# Patient Record
Sex: FEMALE | Race: WHITE | NOT HISPANIC OR LATINO
[De-identification: names, ages, dates, MRNs, and addresses within clinical notes are randomized per-mention and may not be internally consistent; named-entity substitution may affect disease eponyms.]

---

## 2017-08-05 ENCOUNTER — APPOINTMENT (OUTPATIENT)
Dept: FAMILY MEDICINE | Facility: CLINIC | Age: 22
End: 2017-08-05
Payer: COMMERCIAL

## 2017-08-05 VITALS
OXYGEN SATURATION: 99 % | TEMPERATURE: 98.9 F | DIASTOLIC BLOOD PRESSURE: 60 MMHG | SYSTOLIC BLOOD PRESSURE: 112 MMHG | BODY MASS INDEX: 22.66 KG/M2 | WEIGHT: 120 LBS | HEIGHT: 61 IN | HEART RATE: 112 BPM | RESPIRATION RATE: 10 BRPM

## 2017-08-05 DIAGNOSIS — F07.81 POSTCONCUSSIONAL SYNDROME: ICD-10-CM

## 2017-08-05 PROCEDURE — 99213 OFFICE O/P EST LOW 20 MIN: CPT

## 2017-10-21 ENCOUNTER — TRANSCRIPTION ENCOUNTER (OUTPATIENT)
Age: 22
End: 2017-10-21

## 2017-11-29 ENCOUNTER — TRANSCRIPTION ENCOUNTER (OUTPATIENT)
Age: 22
End: 2017-11-29

## 2018-02-06 ENCOUNTER — TRANSCRIPTION ENCOUNTER (OUTPATIENT)
Age: 23
End: 2018-02-06

## 2018-06-19 ENCOUNTER — EMERGENCY (EMERGENCY)
Facility: HOSPITAL | Age: 23
LOS: 1 days | Discharge: HOME | End: 2018-06-19
Attending: EMERGENCY MEDICINE
Payer: COMMERCIAL

## 2018-06-19 VITALS
TEMPERATURE: 98 F | OXYGEN SATURATION: 99 % | HEART RATE: 100 BPM | SYSTOLIC BLOOD PRESSURE: 107 MMHG | DIASTOLIC BLOOD PRESSURE: 75 MMHG | RESPIRATION RATE: 20 BRPM

## 2018-06-19 VITALS — WEIGHT: 130.07 LBS | HEIGHT: 61 IN

## 2018-06-19 DIAGNOSIS — Z90.89 ACQUIRED ABSENCE OF OTHER ORGANS: Chronic | ICD-10-CM

## 2018-06-19 LAB
ALBUMIN SERPL ELPH-MCNC: 4.1 G/DL — SIGNIFICANT CHANGE UP (ref 3.3–5.2)
ALP SERPL-CCNC: 51 U/L — SIGNIFICANT CHANGE UP (ref 40–120)
ALT FLD-CCNC: 17 U/L — SIGNIFICANT CHANGE UP
ANION GAP SERPL CALC-SCNC: 13 MMOL/L — SIGNIFICANT CHANGE UP (ref 5–17)
APPEARANCE UR: CLEAR — SIGNIFICANT CHANGE UP
AST SERPL-CCNC: 35 U/L — HIGH
BACTERIA # UR AUTO: ABNORMAL
BASOPHILS # BLD AUTO: 0 K/UL — SIGNIFICANT CHANGE UP (ref 0–0.2)
BASOPHILS NFR BLD AUTO: 0.3 % — SIGNIFICANT CHANGE UP (ref 0–2)
BILIRUB SERPL-MCNC: 0.4 MG/DL — SIGNIFICANT CHANGE UP (ref 0.4–2)
BILIRUB UR-MCNC: NEGATIVE — SIGNIFICANT CHANGE UP
BUN SERPL-MCNC: 13 MG/DL — SIGNIFICANT CHANGE UP (ref 8–20)
CALCIUM SERPL-MCNC: 8.9 MG/DL — SIGNIFICANT CHANGE UP (ref 8.6–10.2)
CHLORIDE SERPL-SCNC: 104 MMOL/L — SIGNIFICANT CHANGE UP (ref 98–107)
CO2 SERPL-SCNC: 22 MMOL/L — SIGNIFICANT CHANGE UP (ref 22–29)
COLOR SPEC: YELLOW — SIGNIFICANT CHANGE UP
CREAT SERPL-MCNC: 0.72 MG/DL — SIGNIFICANT CHANGE UP (ref 0.5–1.3)
D DIMER BLD IA.RAPID-MCNC: <150 NG/ML DDU — SIGNIFICANT CHANGE UP
DIFF PNL FLD: ABNORMAL
EOSINOPHIL # BLD AUTO: 0.2 K/UL — SIGNIFICANT CHANGE UP (ref 0–0.5)
EOSINOPHIL NFR BLD AUTO: 2.5 % — SIGNIFICANT CHANGE UP (ref 0–6)
EPI CELLS # UR: ABNORMAL
GLUCOSE SERPL-MCNC: 91 MG/DL — SIGNIFICANT CHANGE UP (ref 70–115)
GLUCOSE UR QL: NEGATIVE MG/DL — SIGNIFICANT CHANGE UP
HCG UR QL: NEGATIVE — SIGNIFICANT CHANGE UP
HCT VFR BLD CALC: 39.8 % — SIGNIFICANT CHANGE UP (ref 37–47)
HGB BLD-MCNC: 13.5 G/DL — SIGNIFICANT CHANGE UP (ref 12–16)
KETONES UR-MCNC: NEGATIVE — SIGNIFICANT CHANGE UP
LEUKOCYTE ESTERASE UR-ACNC: ABNORMAL
LYMPHOCYTES # BLD AUTO: 2.1 K/UL — SIGNIFICANT CHANGE UP (ref 1–4.8)
LYMPHOCYTES # BLD AUTO: 23.1 % — SIGNIFICANT CHANGE UP (ref 20–55)
MCHC RBC-ENTMCNC: 30.3 PG — SIGNIFICANT CHANGE UP (ref 27–31)
MCHC RBC-ENTMCNC: 33.9 G/DL — SIGNIFICANT CHANGE UP (ref 32–36)
MCV RBC AUTO: 89.4 FL — SIGNIFICANT CHANGE UP (ref 81–99)
MONOCYTES # BLD AUTO: 1.2 K/UL — HIGH (ref 0–0.8)
MONOCYTES NFR BLD AUTO: 12.6 % — HIGH (ref 3–10)
NEUTROPHILS # BLD AUTO: 5.7 K/UL — SIGNIFICANT CHANGE UP (ref 1.8–8)
NEUTROPHILS NFR BLD AUTO: 61.3 % — SIGNIFICANT CHANGE UP (ref 37–73)
NITRITE UR-MCNC: NEGATIVE — SIGNIFICANT CHANGE UP
PH UR: 6 — SIGNIFICANT CHANGE UP (ref 5–8)
PLATELET # BLD AUTO: 339 K/UL — SIGNIFICANT CHANGE UP (ref 150–400)
POTASSIUM SERPL-MCNC: 5.3 MMOL/L — SIGNIFICANT CHANGE UP (ref 3.5–5.3)
POTASSIUM SERPL-SCNC: 5.3 MMOL/L — SIGNIFICANT CHANGE UP (ref 3.5–5.3)
PROT SERPL-MCNC: 7 G/DL — SIGNIFICANT CHANGE UP (ref 6.6–8.7)
PROT UR-MCNC: 30 MG/DL
RBC # BLD: 4.45 M/UL — SIGNIFICANT CHANGE UP (ref 4.4–5.2)
RBC # FLD: 12.8 % — SIGNIFICANT CHANGE UP (ref 11–15.6)
RBC CASTS # UR COMP ASSIST: ABNORMAL /HPF (ref 0–4)
SODIUM SERPL-SCNC: 139 MMOL/L — SIGNIFICANT CHANGE UP (ref 135–145)
SP GR SPEC: 1.01 — SIGNIFICANT CHANGE UP (ref 1.01–1.02)
UROBILINOGEN FLD QL: NEGATIVE MG/DL — SIGNIFICANT CHANGE UP
WBC # BLD: 9.3 K/UL — SIGNIFICANT CHANGE UP (ref 4.8–10.8)
WBC # FLD AUTO: 9.3 K/UL — SIGNIFICANT CHANGE UP (ref 4.8–10.8)
WBC UR QL: >50

## 2018-06-19 PROCEDURE — 99284 EMERGENCY DEPT VISIT MOD MDM: CPT

## 2018-06-19 PROCEDURE — 85379 FIBRIN DEGRADATION QUANT: CPT

## 2018-06-19 PROCEDURE — 96374 THER/PROPH/DIAG INJ IV PUSH: CPT | Mod: XU

## 2018-06-19 PROCEDURE — 81025 URINE PREGNANCY TEST: CPT

## 2018-06-19 PROCEDURE — 74177 CT ABD & PELVIS W/CONTRAST: CPT | Mod: 26

## 2018-06-19 PROCEDURE — 81001 URINALYSIS AUTO W/SCOPE: CPT

## 2018-06-19 PROCEDURE — 80053 COMPREHEN METABOLIC PANEL: CPT

## 2018-06-19 PROCEDURE — 36415 COLL VENOUS BLD VENIPUNCTURE: CPT

## 2018-06-19 PROCEDURE — 74177 CT ABD & PELVIS W/CONTRAST: CPT

## 2018-06-19 PROCEDURE — 85027 COMPLETE CBC AUTOMATED: CPT

## 2018-06-19 PROCEDURE — 87086 URINE CULTURE/COLONY COUNT: CPT

## 2018-06-19 RX ORDER — ACETAMINOPHEN 500 MG
975 TABLET ORAL ONCE
Qty: 0 | Refills: 0 | Status: COMPLETED | OUTPATIENT
Start: 2018-06-19 | End: 2018-06-19

## 2018-06-19 RX ORDER — CEPHALEXIN 500 MG
1 CAPSULE ORAL
Qty: 28 | Refills: 0 | OUTPATIENT
Start: 2018-06-19 | End: 2018-06-25

## 2018-06-19 RX ORDER — NORETHINDRONE AND ETHINYL ESTRADIOL 0.4-0.035
1 KIT ORAL
Qty: 0 | Refills: 0 | COMMUNITY

## 2018-06-19 RX ORDER — SODIUM CHLORIDE 9 MG/ML
1000 INJECTION INTRAMUSCULAR; INTRAVENOUS; SUBCUTANEOUS ONCE
Qty: 0 | Refills: 0 | Status: COMPLETED | OUTPATIENT
Start: 2018-06-19 | End: 2018-06-19

## 2018-06-19 RX ORDER — CEFTRIAXONE 500 MG/1
1 INJECTION, POWDER, FOR SOLUTION INTRAMUSCULAR; INTRAVENOUS ONCE
Qty: 0 | Refills: 0 | Status: COMPLETED | OUTPATIENT
Start: 2018-06-19 | End: 2018-06-19

## 2018-06-19 RX ADMIN — Medication 975 MILLIGRAM(S): at 08:35

## 2018-06-19 RX ADMIN — SODIUM CHLORIDE 1000 MILLILITER(S): 9 INJECTION INTRAMUSCULAR; INTRAVENOUS; SUBCUTANEOUS at 08:34

## 2018-06-19 RX ADMIN — CEFTRIAXONE 100 GRAM(S): 500 INJECTION, POWDER, FOR SOLUTION INTRAMUSCULAR; INTRAVENOUS at 09:48

## 2018-06-19 NOTE — ED ADULT TRIAGE NOTE - CHIEF COMPLAINT QUOTE
"I woke up with lower back pain, it started Sunday but is worsening and I can't even take a deep breath without pain. I thought I had a UTI because I started having symptoms but they went away and now I have this back pain. " Pt denies fever/chills, states she feel SOB at kvng"

## 2018-06-19 NOTE — ED PROVIDER NOTE - OBJECTIVE STATEMENT
This is a 22 year old female with c/o R sided flank pain x 1 day.  She notes pain radiates to her back.  She reports was having urinary urgency.  She thought she was having a UTI, and notes urinary symptoms resolved, however back pain persisted.   Patient denies any possibility of pregancy.  She reports is on Lo Estro for birth control.  She reports episodes of being out of breath.  She notes no fevers, chills, n/v/d or any recent travel or rashes.

## 2018-06-19 NOTE — ED PROVIDER NOTE - ATTENDING CONTRIBUTION TO CARE
22 year old female seen and evaluated with ACP  PResents for right flank pain x 1 day  notes associated urinary symptoms  vitals reviewed, exam as documented  hcg for pregnancy/ectopic  urine for uti, CT for renal stone/gallstone  treat symptomatically, check results, reassess  abxs for early pyelo  f/u

## 2018-06-21 LAB
CULTURE RESULTS: SIGNIFICANT CHANGE UP
SPECIMEN SOURCE: SIGNIFICANT CHANGE UP

## 2018-07-13 ENCOUNTER — TRANSCRIPTION ENCOUNTER (OUTPATIENT)
Age: 23
End: 2018-07-13

## 2018-08-14 ENCOUNTER — TRANSCRIPTION ENCOUNTER (OUTPATIENT)
Age: 23
End: 2018-08-14

## 2019-01-07 ENCOUNTER — TRANSCRIPTION ENCOUNTER (OUTPATIENT)
Age: 24
End: 2019-01-07

## 2019-01-15 ENCOUNTER — TRANSCRIPTION ENCOUNTER (OUTPATIENT)
Age: 24
End: 2019-01-15

## 2019-05-02 ENCOUNTER — APPOINTMENT (OUTPATIENT)
Dept: FAMILY MEDICINE | Facility: CLINIC | Age: 24
End: 2019-05-02
Payer: COMMERCIAL

## 2019-05-02 VITALS
DIASTOLIC BLOOD PRESSURE: 70 MMHG | BODY MASS INDEX: 22.66 KG/M2 | OXYGEN SATURATION: 98 % | HEIGHT: 61 IN | SYSTOLIC BLOOD PRESSURE: 118 MMHG | RESPIRATION RATE: 12 BRPM | HEART RATE: 90 BPM | WEIGHT: 120 LBS

## 2019-05-02 PROCEDURE — 99213 OFFICE O/P EST LOW 20 MIN: CPT

## 2019-05-02 NOTE — HISTORY OF PRESENT ILLNESS
[FreeTextEntry8] : 23-year-old female not seen in this office since 2017, here to get a referral to dermatology for followup acne.

## 2019-05-02 NOTE — HEALTH RISK ASSESSMENT
[] : No [No falls in past year] : Patient reported no falls in the past year [0] : 2) Feeling down, depressed, or hopeless: Not at all (0) [[id number]] : 0

## 2019-05-02 NOTE — ASSESSMENT
[FreeTextEntry1] : Acne--- dermatology referral given for followup.\par \par Recommend patient schedule CPE and fasting blood work-

## 2019-05-02 NOTE — PHYSICAL EXAM
[No Acute Distress] : no acute distress [Well Nourished] : well nourished [No Respiratory Distress] : no respiratory distress  [Normal Rate] : normal rate  [No Accessory Muscle Use] : no accessory muscle use [Clear to Auscultation] : lungs were clear to auscultation bilaterally [No Edema] : there was no peripheral edema [No Murmur] : no murmur heard [Normal S1, S2] : normal S1 and S2 [Regular Rhythm] : with a regular rhythm [Non Tender] : non-tender [Soft] : abdomen soft [No HSM] : no HSM [No Rash] : no rash [Normal Gait] : normal gait [Acne] : acne [Normal Affect] : the affect was normal [Coordination Grossly Intact] : coordination grossly intact [No Focal Deficits] : no focal deficits [Normal Insight/Judgement] : insight and judgment were intact

## 2019-09-19 ENCOUNTER — APPOINTMENT (OUTPATIENT)
Dept: GASTROENTEROLOGY | Facility: CLINIC | Age: 24
End: 2019-09-19
Payer: COMMERCIAL

## 2019-09-19 VITALS
HEART RATE: 88 BPM | WEIGHT: 149 LBS | BODY MASS INDEX: 28.13 KG/M2 | HEIGHT: 61 IN | SYSTOLIC BLOOD PRESSURE: 104 MMHG | DIASTOLIC BLOOD PRESSURE: 78 MMHG

## 2019-09-19 DIAGNOSIS — Z87.42 PERSONAL HISTORY OF OTHER DISEASES OF THE FEMALE GENITAL TRACT: ICD-10-CM

## 2019-09-19 PROCEDURE — 99203 OFFICE O/P NEW LOW 30 MIN: CPT

## 2019-09-19 RX ORDER — AMOXICILLIN AND CLAVULANATE POTASSIUM 875; 125 MG/1; MG/1
875-125 TABLET, COATED ORAL
Qty: 14 | Refills: 0 | Status: COMPLETED | COMMUNITY
Start: 2017-08-05 | End: 2019-09-19

## 2019-09-19 RX ORDER — LIFITEGRAST 50 MG/ML
5 SOLUTION/ DROPS OPHTHALMIC
Qty: 60 | Refills: 0 | Status: COMPLETED | COMMUNITY
Start: 2017-06-29 | End: 2019-09-19

## 2019-09-20 NOTE — HISTORY OF PRESENT ILLNESS
[de-identified] : This is a 23 year old woman with a history of painful hemorrhoids. She states that for the last 1-2 years she has painful external hemorrhoids. She had a severe episode 2 weeks ago. She has tried multiple OT C meds without relief. She denies constipation or diarrhea.  Today she has no hemorrhoid pain but she is frustrated.

## 2019-09-20 NOTE — ASSESSMENT
[FreeTextEntry1] : This is a 23 year old woman with intermittent painful ext hemorrhoids. She may have had a partially thrombosed hemorrhoid a fe weeks ago I will refer her to colorectal surgery because she is interested in a hemorrhoidectomy.

## 2019-09-20 NOTE — PHYSICAL EXAM
[General Appearance - Alert] : alert [General Appearance - In No Acute Distress] : in no acute distress [Sclera] : the sclera and conjunctiva were normal [Extraocular Movements] : extraocular movements were intact [PERRL With Normal Accommodation] : pupils were equal in size, round, and reactive to light [Oropharynx] : the oropharynx was normal [Outer Ear] : the ears and nose were normal in appearance [Neck Appearance] : the appearance of the neck was normal [Neck Cervical Mass (___cm)] : no neck mass was observed [Thyroid Nodule] : there were no palpable thyroid nodules [Thyroid Diffuse Enlargement] : the thyroid was not enlarged [Jugular Venous Distention Increased] : there was no jugular-venous distention [Auscultation Breath Sounds / Voice Sounds] : lungs were clear to auscultation bilaterally [Heart Sounds Gallop] : no gallops [Heart Rate And Rhythm] : heart rate was normal and rhythm regular [Heart Sounds] : normal S1 and S2 [Murmurs] : no murmurs [Heart Sounds Pericardial Friction Rub] : no pericardial rub [Bowel Sounds] : normal bowel sounds [Abdomen Soft] : soft [Abdomen Tenderness] : non-tender [No CVA Tenderness] : no ~M costovertebral angle tenderness [Abdomen Mass (___ Cm)] : no abdominal mass palpated [No Spinal Tenderness] : no spinal tenderness [Skin Turgor] : normal skin turgor [] : no rash [Skin Color & Pigmentation] : normal skin color and pigmentation [Affect] : the affect was normal [Impaired Insight] : insight and judgment were intact [Oriented To Time, Place, And Person] : oriented to person, place, and time

## 2019-10-01 ENCOUNTER — APPOINTMENT (OUTPATIENT)
Dept: COLORECTAL SURGERY | Facility: CLINIC | Age: 24
End: 2019-10-01
Payer: COMMERCIAL

## 2019-10-01 VITALS
BODY MASS INDEX: 27.48 KG/M2 | DIASTOLIC BLOOD PRESSURE: 67 MMHG | HEART RATE: 80 BPM | WEIGHT: 140 LBS | SYSTOLIC BLOOD PRESSURE: 106 MMHG | RESPIRATION RATE: 14 BRPM | TEMPERATURE: 98.6 F | HEIGHT: 60 IN

## 2019-10-01 PROCEDURE — 99203 OFFICE O/P NEW LOW 30 MIN: CPT

## 2019-10-01 NOTE — HISTORY OF PRESENT ILLNESS
[FreeTextEntry1] : Liza presents to the office for consultation. She reports a h/o hemorrhoidal skin tags that she would like removed secondary to irritation and pain. She denies issues with constipation or straining to pass stools or to lift heavy items. She denies hemorrhoidal burning, itching or knife like pains. There is very occasional bleeding when wiping after BMs.

## 2019-10-01 NOTE — PHYSICAL EXAM
[Normal rectal exam] : exam was normal [Reduce Spontaneously] : a spontaneously reducible (grade II) [Skin Tags] : residual hemorrhoidal skin tags were noted [Normal] : was normal [None] : there was no rectal mass  [No Rash or Lesion] : No rash or lesion [Alert] : alert [Oriented to Person] : oriented to person [Oriented to Place] : oriented to place [Oriented to Time] : oriented to time [Calm] : calm [de-identified] : Anterior and posterior dimunitive skin tags [de-identified] : NAD [de-identified] : NCAT [de-identified] : BEATTY x 4

## 2019-10-01 NOTE — ASSESSMENT
[FreeTextEntry1] : Ms. Marquez presents to the office for discussion regarding bedside excision of a residual hemorrhoidal skin tags. She was advised of the elective nature of this office procedure, the duration of the procedure, methods of excision using lidocaine for anesthesia and cautery for hemostasis, post procedure pain and limitations on activities, methods for wound care, as well as the time until recovery. She understands and is agreeable, and will schedule this to be completed in the near future.\par \par

## 2019-10-14 ENCOUNTER — APPOINTMENT (OUTPATIENT)
Dept: COLORECTAL SURGERY | Facility: CLINIC | Age: 24
End: 2019-10-14
Payer: COMMERCIAL

## 2019-10-14 VITALS
SYSTOLIC BLOOD PRESSURE: 117 MMHG | HEIGHT: 60 IN | BODY MASS INDEX: 27.48 KG/M2 | RESPIRATION RATE: 14 BRPM | TEMPERATURE: 98.5 F | HEART RATE: 79 BPM | WEIGHT: 140 LBS | DIASTOLIC BLOOD PRESSURE: 71 MMHG

## 2019-10-14 PROCEDURE — 46230 REMOVAL OF ANAL TAGS: CPT

## 2019-10-14 PROCEDURE — 99214 OFFICE O/P EST MOD 30 MIN: CPT | Mod: 25

## 2019-10-14 NOTE — ASSESSMENT
[FreeTextEntry1] : Liza presents to the office for excision of her anal skin tags. This was performed at the office bedside. The operative site was prepped with Betadine, marked and anesthetized with 1% lidocaine to good effect. Sharp scissors was used to perform the excision and Monsell's was then utilized for hemostasis.\par Patient was advised to expect post procedure pain for at least one week's time. During this period, she is to avoid significant amount of straining or activity that could result in excess tissue swelling and lead to a recurrence of the external hemorrhoid/skin tag. She was also advised to expect post procedure drainage of the wound as it heals in by secondary intention. Cotton gauze should be placed within the undergarments to prevent soilage, and sitz bath should be performed 2-3 times daily to keep the site wound site clean and facilitate healing.\par

## 2019-10-14 NOTE — PHYSICAL EXAM
[Reduce Spontaneously] : a spontaneously reducible (grade II) [Normal rectal exam] : exam was normal [Skin Tags] : residual hemorrhoidal skin tags were noted [Normal] : was normal [None] : there was no rectal mass  [No Rash or Lesion] : No rash or lesion [Alert] : alert [Oriented to Person] : oriented to person [Oriented to Place] : oriented to place [Calm] : calm [Oriented to Time] : oriented to time [de-identified] : NAD [de-identified] : Anterior and posterior dimunitive skin tags [de-identified] : BEATTY x 4 [de-identified] : NCAT

## 2019-10-21 ENCOUNTER — APPOINTMENT (OUTPATIENT)
Dept: COLORECTAL SURGERY | Facility: CLINIC | Age: 24
End: 2019-10-21
Payer: COMMERCIAL

## 2019-10-21 ENCOUNTER — TRANSCRIPTION ENCOUNTER (OUTPATIENT)
Age: 24
End: 2019-10-21

## 2019-10-21 VITALS
TEMPERATURE: 98.6 F | WEIGHT: 140 LBS | BODY MASS INDEX: 27.48 KG/M2 | HEART RATE: 103 BPM | HEIGHT: 60 IN | RESPIRATION RATE: 14 BRPM | SYSTOLIC BLOOD PRESSURE: 121 MMHG | DIASTOLIC BLOOD PRESSURE: 66 MMHG

## 2019-10-21 DIAGNOSIS — K64.9 UNSPECIFIED HEMORRHOIDS: ICD-10-CM

## 2019-10-21 PROCEDURE — 99024 POSTOP FOLLOW-UP VISIT: CPT

## 2019-10-21 NOTE — HISTORY OF PRESENT ILLNESS
[FreeTextEntry1] : 23 year old female who presents for follow up appointment after undergoing office based excision of external hemorrhoids. She is doing well and reports improved pain but noticed a lump in the excision area which concerned her. No fevers or chills. She is having regular bowel movements without difficulty.

## 2019-10-21 NOTE — PHYSICAL EXAM
[Respiratory Effort] : normal respiratory effort [Calm] : calm [de-identified] : healing perianal excision wounds without sign of infection [de-identified] : well appearing, in no distress [de-identified] : normocephalic, atraumatic [de-identified] : alert and oriented x 3 [de-identified] : warm and dry

## 2020-01-07 ENCOUNTER — APPOINTMENT (OUTPATIENT)
Dept: FAMILY MEDICINE | Facility: CLINIC | Age: 25
End: 2020-01-07
Payer: COMMERCIAL

## 2020-01-07 ENCOUNTER — TRANSCRIPTION ENCOUNTER (OUTPATIENT)
Age: 25
End: 2020-01-07

## 2020-01-07 VITALS
DIASTOLIC BLOOD PRESSURE: 70 MMHG | BODY MASS INDEX: 27.48 KG/M2 | SYSTOLIC BLOOD PRESSURE: 118 MMHG | RESPIRATION RATE: 12 BRPM | HEIGHT: 60 IN | WEIGHT: 140 LBS | HEART RATE: 85 BPM | OXYGEN SATURATION: 98 %

## 2020-01-07 DIAGNOSIS — Z83.3 FAMILY HISTORY OF DIABETES MELLITUS: ICD-10-CM

## 2020-01-07 DIAGNOSIS — Z80.9 FAMILY HISTORY OF MALIGNANT NEOPLASM, UNSPECIFIED: ICD-10-CM

## 2020-01-07 DIAGNOSIS — R05 COUGH: ICD-10-CM

## 2020-01-07 DIAGNOSIS — Z78.9 OTHER SPECIFIED HEALTH STATUS: ICD-10-CM

## 2020-01-07 DIAGNOSIS — Z87.09 PERSONAL HISTORY OF OTHER DISEASES OF THE RESPIRATORY SYSTEM: ICD-10-CM

## 2020-01-07 DIAGNOSIS — L70.9 ACNE, UNSPECIFIED: ICD-10-CM

## 2020-01-07 DIAGNOSIS — Z20.828 CONTACT WITH AND (SUSPECTED) EXPOSURE TO OTHER VIRAL COMMUNICABLE DISEASES: ICD-10-CM

## 2020-01-07 DIAGNOSIS — J06.9 ACUTE UPPER RESPIRATORY INFECTION, UNSPECIFIED: ICD-10-CM

## 2020-01-07 DIAGNOSIS — R68.89 OTHER GENERAL SYMPTOMS AND SIGNS: ICD-10-CM

## 2020-01-07 PROCEDURE — 99214 OFFICE O/P EST MOD 30 MIN: CPT

## 2020-01-07 RX ORDER — AMOXICILLIN 875 MG/1
875 TABLET, FILM COATED ORAL
Qty: 20 | Refills: 0 | Status: COMPLETED | COMMUNITY
Start: 2019-10-21

## 2020-01-07 RX ORDER — TRETINOIN 0.25 MG/G
0.03 CREAM TOPICAL
Qty: 20 | Refills: 0 | Status: COMPLETED | COMMUNITY
Start: 2019-10-04

## 2020-01-13 ENCOUNTER — TRANSCRIPTION ENCOUNTER (OUTPATIENT)
Age: 25
End: 2020-01-13

## 2020-01-14 ENCOUNTER — APPOINTMENT (OUTPATIENT)
Dept: COLORECTAL SURGERY | Facility: CLINIC | Age: 25
End: 2020-01-14
Payer: MEDICAID

## 2020-01-14 VITALS
DIASTOLIC BLOOD PRESSURE: 71 MMHG | HEART RATE: 99 BPM | WEIGHT: 140 LBS | SYSTOLIC BLOOD PRESSURE: 102 MMHG | BODY MASS INDEX: 27.48 KG/M2 | RESPIRATION RATE: 14 BRPM | HEIGHT: 60 IN

## 2020-01-14 DIAGNOSIS — K64.4 RESIDUAL HEMORRHOIDAL SKIN TAGS: ICD-10-CM

## 2020-01-14 PROCEDURE — 99214 OFFICE O/P EST MOD 30 MIN: CPT | Mod: 25

## 2020-01-14 PROCEDURE — 46220 EXCISE ANAL EXT TAG/PAPILLA: CPT

## 2020-01-14 NOTE — HISTORY OF PRESENT ILLNESS
[FreeTextEntry1] : Liza returns to office for followup of her external hemorrhoids. She reports healing well from her bedside excision of residual hemorrhoidal skin tags. Unfortunately, in the month of December, she had a very busy schedule at work and perfomed significant heavy lifting with resultant swollen external hemorrhoids that have only recently begun to subside. She continues to deny any significant straining to evacuate stools, and notes her stools are soft and passed on a regular basis without bleeding or other issues.

## 2020-01-14 NOTE — PHYSICAL EXAM
[Tender, Swollen] : tender, swollen [Thrombosed] : that was thrombosed [de-identified] : Left posterior

## 2020-01-14 NOTE — ASSESSMENT
[FreeTextEntry1] : Ms. Marquez presents to the office for excision of external hemorrhoidal skin tag with associated thrombosis. This was performed at the office bedside. The operative site was prepped with Betadine, marked and anesthetized with 1% lidocaine to good effect. Sharp scissors was used to perform the excision and Monsell's was then utilized for hemostasis.\par Patient was advised to expect post procedure pain for at least one week's time. During this period, she is to avoid significant amount of straining or activity that could result in excess tissue swelling and lead to a recurrence of the external hemorrhoid/skin tag. She was also advised to expect post procedure drainage of the wound as it heals in by secondary intention. Cotton gauze should be placed within the undergarments to prevent soilage, and sitz bath should be performed 2-3 times daily to keep the site wound site clean and facilitate healing.\par

## 2020-01-22 ENCOUNTER — APPOINTMENT (OUTPATIENT)
Dept: COLORECTAL SURGERY | Facility: CLINIC | Age: 25
End: 2020-01-22
Payer: MEDICAID

## 2020-01-22 VITALS
WEIGHT: 140 LBS | TEMPERATURE: 98.5 F | SYSTOLIC BLOOD PRESSURE: 114 MMHG | RESPIRATION RATE: 14 BRPM | HEART RATE: 99 BPM | DIASTOLIC BLOOD PRESSURE: 76 MMHG | HEIGHT: 60 IN | BODY MASS INDEX: 27.48 KG/M2

## 2020-01-22 DIAGNOSIS — K62.89 OTHER SPECIFIED DISEASES OF ANUS AND RECTUM: ICD-10-CM

## 2020-01-22 DIAGNOSIS — K60.2 ANAL FISSURE, UNSPECIFIED: ICD-10-CM

## 2020-01-22 PROCEDURE — 99214 OFFICE O/P EST MOD 30 MIN: CPT

## 2020-01-22 NOTE — ASSESSMENT
[FreeTextEntry1] : Ms. Marquez presents to the office for excision of external hemorrhoidal skin tag with associated thrombosis. This was performed at the office bedside. The operative site was prepped with Betadine, marked and anesthetized with 1% lidocaine to good effect. Sharp scissors was used to perform the excision and Monsell's was then utilized for hemostasis.\par Patient was advised to expect post procedure pain for at least one week's time. During this period, she is to avoid significant amount of straining or activity that could result in excess tissue swelling and lead to a recurrence of the external hemorrhoid/skin tag. She was also advised to expect post procedure drainage of the wound as it heals in by secondary intention. Cotton gauze should be placed within the undergarments to prevent soilage, and sitz bath should be performed 2-3 times daily to keep the site wound site clean and facilitate healing.\par \par 1/22/20 Returns to office for followup given anorectal pain. PE reveals that the wound base is clean but because of site of excision close to anal verge, may be causing anal fissure like pain with defecation. Advised pt of the findings and recommendations for bowel regimen, warm sitz baths, ibuprofen and analpram cream.\par Otherwise reassured that she appears to be healing in as expected.

## 2020-01-22 NOTE — HISTORY OF PRESENT ILLNESS
[FreeTextEntry1] : Liza returns to office for followup of her external hemorrhoids. She reports healing well from her bedside excision of residual hemorrhoidal skin tags. Unfortunately, in the month of December, she had a very busy schedule at work and perfomed significant heavy lifting with resultant swollen external hemorrhoids that have only recently begun to subside. She continues to deny any significant straining to evacuate stools, and notes her stools are soft and passed on a regular basis without bleeding or other issues.\par \par 1/22/20 Since her last office visit 1 week earlier, she reports not healing as well from this bedside excision as from prior. She continues passing soft stools, but reports sharp stabbing pains afterwards and associated throbbing. 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

## 2020-01-22 NOTE — PHYSICAL EXAM
[Tender, Swollen] : nontender, non-swollen [Thrombosed] : that was not thrombosed [de-identified] : Left posterior wound clean and granulating without infection or e/o swelling

## 2020-08-11 ENCOUNTER — TRANSCRIPTION ENCOUNTER (OUTPATIENT)
Age: 25
End: 2020-08-11

## 2020-11-07 ENCOUNTER — TRANSCRIPTION ENCOUNTER (OUTPATIENT)
Age: 25
End: 2020-11-07

## 2021-12-08 ENCOUNTER — TRANSCRIPTION ENCOUNTER (OUTPATIENT)
Age: 26
End: 2021-12-08

## 2022-03-24 ENCOUNTER — TRANSCRIPTION ENCOUNTER (OUTPATIENT)
Age: 27
End: 2022-03-24

## 2022-04-11 ENCOUNTER — APPOINTMENT (OUTPATIENT)
Dept: NEUROLOGY | Facility: CLINIC | Age: 27
End: 2022-04-11
Payer: COMMERCIAL

## 2022-04-11 ENCOUNTER — NON-APPOINTMENT (OUTPATIENT)
Age: 27
End: 2022-04-11

## 2022-04-11 VITALS
BODY MASS INDEX: 27.48 KG/M2 | WEIGHT: 140 LBS | DIASTOLIC BLOOD PRESSURE: 80 MMHG | HEIGHT: 60 IN | SYSTOLIC BLOOD PRESSURE: 114 MMHG

## 2022-04-11 DIAGNOSIS — R51.9 HEADACHE, UNSPECIFIED: ICD-10-CM

## 2022-04-11 PROCEDURE — 99204 OFFICE O/P NEW MOD 45 MIN: CPT

## 2022-04-11 RX ORDER — HYDROCORTISONE 25 MG/G
2.5 CREAM TOPICAL
Qty: 1 | Refills: 2 | Status: COMPLETED | COMMUNITY
Start: 2020-01-22 | End: 2022-04-11

## 2022-04-11 RX ORDER — OSELTAMIVIR PHOSPHATE 75 MG/1
75 CAPSULE ORAL TWICE DAILY
Qty: 10 | Refills: 0 | Status: COMPLETED | COMMUNITY
Start: 2020-01-07 | End: 2022-04-11

## 2022-04-11 RX ORDER — ONDANSETRON 4 MG/1
4 TABLET, ORALLY DISINTEGRATING ORAL
Qty: 15 | Refills: 0 | Status: COMPLETED | COMMUNITY
Start: 2020-01-12 | End: 2022-04-11

## 2022-04-11 RX ORDER — DAPSONE 75 MG/G
7.5 GEL TOPICAL
Qty: 60 | Refills: 0 | Status: COMPLETED | COMMUNITY
Start: 2019-05-08 | End: 2022-04-11

## 2022-04-11 RX ORDER — TRETINOIN 1 MG/G
0.1 CREAM TOPICAL
Qty: 20 | Refills: 0 | Status: COMPLETED | COMMUNITY
Start: 2019-05-08 | End: 2022-04-11

## 2022-04-11 RX ORDER — HYDROCORTISONE 25 MG/G
2.5 CREAM TOPICAL
Qty: 28 | Refills: 0 | Status: COMPLETED | COMMUNITY
Start: 2019-07-29 | End: 2022-04-11

## 2022-04-11 NOTE — ASSESSMENT
[FreeTextEntry1] : Intractable migraine headaches\par Her examination is normal with flat discs\par I am suggesting prophylaxis with propranolol 40 mg twice a day\par She can continue to use the sumatriptan but she says she will use 50 mg instead of 100 mg due to the above-mentioned side effects\par Follow-up in 6 weeks and by phone prior to that if needed.

## 2022-04-11 NOTE — HISTORY OF PRESENT ILLNESS
[FreeTextEntry1] : She reports migraine type headaches going on for at least the last 10 years.  She has seen a neurologist in the past with normal brain MRI.  They have become more frequent.  Now getting them almost every other day.  She gets severe pain behind the eyes.  Is throbbing.  Occasionally blurred vision.  Frequent nausea with occasional vomiting.  Typically there is photophobia.  Sumatriptan  100 mg generally works but she feels high on it and does not like taking it.\par \par Medical history otherwise unremarkable\par \par She is a .

## 2022-04-11 NOTE — CONSULT LETTER
[Dear  ___] : Dear  [unfilled], [Consult Letter:] : I had the pleasure of evaluating your patient, [unfilled]. [Please see my note below.] : Please see my note below. [Consult Closing:] : Thank you very much for allowing me to participate in the care of this patient.  If you have any questions, please do not hesitate to contact me. [Sincerely,] : Sincerely, [FreeTextEntry3] : Jerald Colunga MD, PhD, DPN-N\par [facility]\par Neurology at [place]\par Chief, Division of Neurology\par [facility]\par

## 2022-04-11 NOTE — PHYSICAL EXAM
[FreeTextEntry1] : There is no cervical palpation tenderness.\par There is full range of movement of the cervical spine\par  [General Appearance - Alert] : alert [General Appearance - In No Acute Distress] : in no acute distress [General Appearance - Well-Appearing] : healthy appearing [Oriented To Time, Place, And Person] : oriented to person, place, and time [Impaired Insight] : insight and judgment were intact [Memory Recent] : recent memory was not impaired [Affect] : the affect was normal [Person] : oriented to person [Place] : oriented to place [Time] : oriented to time [Concentration Intact] : normal concentrating ability [Fluency] : fluency intact [Comprehension] : comprehension intact [Cranial Nerves Optic (II)] : visual acuity intact bilaterally,  visual fields full to confrontation, pupils equal round and reactive to light [Cranial Nerves Oculomotor (III)] : extraocular motion intact [Cranial Nerves Trigeminal (V)] : facial sensation intact symmetrically [Cranial Nerves Facial (VII)] : face symmetrical [Cranial Nerves Vestibulocochlear (VIII)] : hearing was intact bilaterally [Cranial Nerves Glossopharyngeal (IX)] : tongue and palate midline [Cranial Nerves Accessory (XI - Cranial And Spinal)] : head turning and shoulder shrug symmetric [Cranial Nerves Hypoglossal (XII)] : there was no tongue deviation with protrusion [Motor Tone] : muscle tone was normal in all four extremities [Motor Strength] : muscle strength was normal in all four extremities [No Muscle Atrophy] : normal bulk in all four extremities [Paresis Pronator Drift Right-Sided] : no pronator drift on the right [Paresis Pronator Drift Left-Sided] : no pronator drift on the left [Sensation Tactile Decrease] : light touch was intact [Sensation Pain / Temperature Decrease] : pain and temperature was intact [Romberg's Sign] : Romberg's sign was negtive [Abnormal Walk] : normal gait [Balance] : balance was intact [Past-pointing] : there was no past-pointing [Tremor] : no tremor present [Coordination - Dysmetria Impaired Finger-to-Nose Bilateral] : not present [Coordination - Dysmetria Impaired Heel-to-Shin Bilateral] : not present [2+] : Ankle jerk left 2+ [Plantar Reflex Right Only] : normal on the right [Plantar Reflex Left Only] : normal on the left [PERRL With Normal Accommodation] : pupils were equal in size, round, reactive to light, with normal accommodation [Extraocular Movements] : extraocular movements were intact [Optic Disc Abnormality] : the optic disc were normal in size and color [Full Visual Field] : full visual field

## 2022-05-23 ENCOUNTER — APPOINTMENT (OUTPATIENT)
Dept: NEUROLOGY | Facility: CLINIC | Age: 27
End: 2022-05-23
Payer: COMMERCIAL

## 2022-05-23 VITALS
SYSTOLIC BLOOD PRESSURE: 110 MMHG | WEIGHT: 135 LBS | HEIGHT: 60 IN | BODY MASS INDEX: 26.5 KG/M2 | DIASTOLIC BLOOD PRESSURE: 60 MMHG

## 2022-05-23 PROCEDURE — 99214 OFFICE O/P EST MOD 30 MIN: CPT

## 2022-05-23 NOTE — CONSULT LETTER
[Dear  ___] : Dear  [unfilled], [Consult Letter:] : I had the pleasure of evaluating your patient, [unfilled]. [Please see my note below.] : Please see my note below. [Consult Closing:] : Thank you very much for allowing me to participate in the care of this patient.  If you have any questions, please do not hesitate to contact me. [Sincerely,] : Sincerely, [FreeTextEntry3] : Jerald Colunga MD, PhD, DPN-N\par [facility]\par Neurology at [place]\par Chief, Division of Neurology\par [facility]\par

## 2022-05-23 NOTE — PHYSICAL EXAM
[FreeTextEntry1] : \par  [General Appearance - Alert] : alert [General Appearance - In No Acute Distress] : in no acute distress [General Appearance - Well-Appearing] : healthy appearing [Oriented To Time, Place, And Person] : oriented to person, place, and time [Impaired Insight] : insight and judgment were intact [Affect] : the affect was normal [Memory Recent] : recent memory was not impaired [Person] : oriented to person [Place] : oriented to place [Time] : oriented to time [Concentration Intact] : normal concentrating ability [Fluency] : fluency intact [Comprehension] : comprehension intact [Cranial Nerves Optic (II)] : visual acuity intact bilaterally,  visual fields full to confrontation, pupils equal round and reactive to light [Cranial Nerves Oculomotor (III)] : extraocular motion intact [Cranial Nerves Trigeminal (V)] : facial sensation intact symmetrically [Cranial Nerves Facial (VII)] : face symmetrical [Cranial Nerves Vestibulocochlear (VIII)] : hearing was intact bilaterally [Cranial Nerves Glossopharyngeal (IX)] : tongue and palate midline [Cranial Nerves Accessory (XI - Cranial And Spinal)] : head turning and shoulder shrug symmetric [Cranial Nerves Hypoglossal (XII)] : there was no tongue deviation with protrusion [Motor Tone] : muscle tone was normal in all four extremities [Motor Strength] : muscle strength was normal in all four extremities [No Muscle Atrophy] : normal bulk in all four extremities [Paresis Pronator Drift Right-Sided] : no pronator drift on the right [Paresis Pronator Drift Left-Sided] : no pronator drift on the left [Sensation Tactile Decrease] : light touch was intact [Sensation Pain / Temperature Decrease] : pain and temperature was intact [Romberg's Sign] : Romberg's sign was negtive [Abnormal Walk] : normal gait [Balance] : balance was intact [Past-pointing] : there was no past-pointing [Tremor] : no tremor present [Coordination - Dysmetria Impaired Finger-to-Nose Bilateral] : not present [Coordination - Dysmetria Impaired Heel-to-Shin Bilateral] : not present [2+] : Ankle jerk left 2+ [Plantar Reflex Right Only] : normal on the right [Plantar Reflex Left Only] : normal on the left [PERRL With Normal Accommodation] : pupils were equal in size, round, reactive to light, with normal accommodation [Extraocular Movements] : extraocular movements were intact [Optic Disc Abnormality] : the optic disc were normal in size and color [Full Visual Field] : full visual field

## 2022-05-23 NOTE — HISTORY OF PRESENT ILLNESS
[FreeTextEntry1] : I saw her initially 4/11/2022 with the following history.  She reports migraine type headaches going on for at least the last 10 years.  She has seen a neurologist in the past with normal brain MRI.  They have become more frequent.  Now getting them almost every other day.  She gets severe pain behind the eyes.  Is throbbing.  Occasionally blurred vision.  Frequent nausea with occasional vomiting.  Typically there is photophobia.  Sumatriptan  100 mg generally works but she feels high on it and does not like taking it.\par \par Medical history otherwise unremarkable\par \par She is a .\par \par I started her on propranolol 40 mg twice a day for headache prophylaxis.  She has had an excellent response with no significant headaches since starting the propranolol.\par \par However, she informs me today, 5/23/2022 that she may be in early pregnancy.

## 2022-05-23 NOTE — END OF VISIT
[Time Spent: ___ minutes] : I have spent [unfilled] minutes of time on the encounter. complains of pain/discomfort

## 2022-05-23 NOTE — ASSESSMENT
[FreeTextEntry1] : Intractable migraine headaches\par Her examination is normal \par She has had an excellent response to propranolol 40 mg twice a day for headache prophylaxis\par However, she informs me today that she may be pregnant\par I have told her that she cannot take the sumatriptan  and to hold off on the propranolol until she clears it with her gynecologist\par \par Follow-up here in 3 months and by phone prior to that as needed.

## 2022-07-10 ENCOUNTER — RX RENEWAL (OUTPATIENT)
Age: 27
End: 2022-07-10

## 2022-08-23 ENCOUNTER — APPOINTMENT (OUTPATIENT)
Dept: NEUROLOGY | Facility: CLINIC | Age: 27
End: 2022-08-23

## 2022-08-23 VITALS
SYSTOLIC BLOOD PRESSURE: 116 MMHG | DIASTOLIC BLOOD PRESSURE: 80 MMHG | BODY MASS INDEX: 27.68 KG/M2 | HEIGHT: 60 IN | WEIGHT: 141 LBS

## 2022-08-23 DIAGNOSIS — G43.019 MIGRAINE W/OUT AURA, INTRACTABLE, W/OUT STATUS MIGRAINOSUS: ICD-10-CM

## 2022-08-23 PROCEDURE — 99214 OFFICE O/P EST MOD 30 MIN: CPT

## 2022-08-23 NOTE — HISTORY OF PRESENT ILLNESS
[FreeTextEntry1] : I saw her initially 4/11/2022 with the following history.  She reports migraine type headaches going on for at least the last 10 years.  She has seen a neurologist in the past with normal brain MRI.  They have become more frequent.  Now getting them almost every other day.  She gets severe pain behind the eyes.  Is throbbing.  Occasionally blurred vision.  Frequent nausea with occasional vomiting.  Typically there is photophobia.  Sumatriptan  100 mg generally works but she feels high on it and does not like taking it.\par \par Medical history otherwise unremarkable\par \par She is a .\par \par I started her on propranolol 40 mg twice a day for headache prophylaxis.  She has had an excellent response with no significant headaches since starting the propranolol.\par \par She is 18 weeks pregnant.  She continues on the propranolol after  clearing it with her gynecologist.  It is working well.

## 2022-08-23 NOTE — ASSESSMENT
[FreeTextEntry1] : Intractable migraine headaches\par Her examination is normal \par She has had an excellent response to propranolol 40 mg twice a day for headache prophylaxis\par Now 18 weeks  pregnant\par Aware that she should not be using the sumatriptan and she does not really need it as propranolol is working well\par \par Follow-up here in 6 months and by phone prior to that as needed.

## 2022-08-23 NOTE — CONSULT LETTER
[Dear  ___] : Dear  [unfilled], [Consult Letter:] : I had the pleasure of evaluating your patient, [unfilled]. [Please see my note below.] : Please see my note below. [Consult Closing:] : Thank you very much for allowing me to participate in the care of this patient.  If you have any questions, please do not hesitate to contact me. [Sincerely,] : Sincerely, [FreeTextEntry3] : Jerald Colunga MD, PhD, DPN-N\par [facility]\par Neurology at [place]\par Chief, Division of Neurology\par [facility]\par

## 2022-11-10 ENCOUNTER — NON-APPOINTMENT (OUTPATIENT)
Age: 27
End: 2022-11-10

## 2022-12-27 ENCOUNTER — NON-APPOINTMENT (OUTPATIENT)
Age: 27
End: 2022-12-27

## 2023-02-23 ENCOUNTER — APPOINTMENT (OUTPATIENT)
Dept: NEUROLOGY | Facility: CLINIC | Age: 28
End: 2023-02-23

## 2023-03-23 ENCOUNTER — APPOINTMENT (OUTPATIENT)
Dept: COLORECTAL SURGERY | Facility: CLINIC | Age: 28
End: 2023-03-23
Payer: COMMERCIAL

## 2023-03-23 VITALS
HEART RATE: 94 BPM | RESPIRATION RATE: 14 BRPM | DIASTOLIC BLOOD PRESSURE: 70 MMHG | WEIGHT: 173 LBS | HEIGHT: 61 IN | TEMPERATURE: 98.7 F | OXYGEN SATURATION: 99 % | BODY MASS INDEX: 32.66 KG/M2 | SYSTOLIC BLOOD PRESSURE: 117 MMHG

## 2023-03-23 DIAGNOSIS — K64.4 RESIDUAL HEMORRHOIDAL SKIN TAGS: ICD-10-CM

## 2023-03-23 PROCEDURE — 46600 DIAGNOSTIC ANOSCOPY SPX: CPT

## 2023-03-23 NOTE — ASSESSMENT
[FreeTextEntry1] : Ms. Marquez presents to the office for excision of external hemorrhoidal skin tag with associated thrombosis. This was performed at the office bedside. The operative site was prepped with Betadine, marked and anesthetized with 1% lidocaine to good effect. Sharp scissors was used to perform the excision and Monsell's was then utilized for hemostasis.\par Patient was advised to expect post procedure pain for at least one week's time. During this period, she is to avoid significant amount of straining or activity that could result in excess tissue swelling and lead to a recurrence of the external hemorrhoid/skin tag. She was also advised to expect post procedure drainage of the wound as it heals in by secondary intention. Cotton gauze should be placed within the undergarments to prevent soilage, and sitz bath should be performed 2-3 times daily to keep the site wound site clean and facilitate healing.\par \par 1/22/20 Returns to office for followup given anorectal pain. PE reveals that the wound base is clean but because of site of excision close to anal verge, may be causing anal fissure like pain with defecation. Advised pt of the findings and recommendations for bowel regimen, warm sitz baths, ibuprofen and analpram cream.\par Otherwise reassured that she appears to be healing in as expected.\par \par 3/23/23  Liza presents to the office with circumferential external hemorrhoids that she would like to have removed secondary to difficulty with hygiene as well as swelling and discomfort. I have advised her on the need to maintain a regular bowel regimen in the preop and postoperative period to prevent recurrent disease.  I have discussed with the risks/benefits/alternatives for external hemorrhoidectomy. Although the surgery is performed as a same-day procedure, and does not take longer than an hour to complete, the postoperative pain is the most difficult obstacle for patients to overcome. She is to expect rectal pain, pressure and discomfort for anywhere from 2-4 weeks duration during which time activity will likely be kept to a minimum. She will be provided with narcotic medication in addition to anti-inflammatories to alleviate the discomfort, and she should perform sitz baths frequently for comfort. Postoperatively, she will have open perianal wounds that will heal by secondary intention, so she is to expect serosanguineous drainage that will gradually improve.  She is aware of the likelihood for recurrent disease if she becomes pregnant again in the future.  After all questions and concerns were addressed, she consented to proceed.\par \par \par \par

## 2023-03-23 NOTE — HISTORY OF PRESENT ILLNESS
[FreeTextEntry1] : Liza returns to office for followup of her external hemorrhoids. She reports healing well from her bedside excision of residual hemorrhoidal skin tags. Unfortunately, in the month of December, she had a very busy schedule at work and perfomed significant heavy lifting with resultant swollen external hemorrhoids that have only recently begun to subside. She continues to deny any significant straining to evacuate stools, and notes her stools are soft and passed on a regular basis without bleeding or other issues.\par \par 1/22/20 Since her last office visit 1 week earlier, she reports not healing as well from this bedside excision as from prior. She continues passing soft stools, but reports sharp stabbing pains afterwards and associated throbbing. 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  Since her last appointment, she has delivered her first child, a baby girl–Armando, in January 2023.  During her pregnancy and delivery, she developed very large external hemorrhoids which have become less edematous and are now circumferential residual skin tags.  She has a significant amount of difficulties maintaining anorectal hygiene, and as such, it is impairing her quality of life.  Here to discuss excision.  No reports of constipation, straining or rectal bleeding.

## 2023-03-23 NOTE — PHYSICAL EXAM
[Normal rectal exam] : exam was normal [Reduce Spontaneously] : a spontaneously reducible (grade II) [Tender, Swollen] : nontender, non-swollen [Thrombosed] : that was not thrombosed [Skin Tags] : residual hemorrhoidal skin tags were noted [Normal] : was normal [None] : there was no rectal mass  [Gross Blood] : no gross blood [No Rash or Lesion] : No rash or lesion [Alert] : alert [Oriented to Person] : oriented to person [Oriented to Place] : oriented to place [Oriented to Time] : oriented to time [Calm] : calm [de-identified] : Posterior, left lateral, right anterior external hemorrhoids. [de-identified] : No apparent distress [de-identified] : Normocephalic atraumatic [de-identified] : Moving all extremities x4

## 2023-03-27 ENCOUNTER — OUTPATIENT (OUTPATIENT)
Dept: OUTPATIENT SERVICES | Facility: HOSPITAL | Age: 28
LOS: 1 days | End: 2023-03-27
Payer: COMMERCIAL

## 2023-03-27 DIAGNOSIS — Z01.818 ENCOUNTER FOR OTHER PREPROCEDURAL EXAMINATION: ICD-10-CM

## 2023-03-27 DIAGNOSIS — Z90.89 ACQUIRED ABSENCE OF OTHER ORGANS: Chronic | ICD-10-CM

## 2023-03-27 LAB
A1C WITH ESTIMATED AVERAGE GLUCOSE RESULT: 5.2 % — SIGNIFICANT CHANGE UP (ref 4–5.6)
ANION GAP SERPL CALC-SCNC: 11 MMOL/L — SIGNIFICANT CHANGE UP (ref 5–17)
APTT BLD: 28.9 SEC — SIGNIFICANT CHANGE UP (ref 27.5–35.5)
BASOPHILS # BLD AUTO: 0.04 K/UL — SIGNIFICANT CHANGE UP (ref 0–0.2)
BASOPHILS NFR BLD AUTO: 0.6 % — SIGNIFICANT CHANGE UP (ref 0–2)
BUN SERPL-MCNC: 14.7 MG/DL — SIGNIFICANT CHANGE UP (ref 8–20)
CALCIUM SERPL-MCNC: 9.7 MG/DL — SIGNIFICANT CHANGE UP (ref 8.4–10.5)
CHLORIDE SERPL-SCNC: 104 MMOL/L — SIGNIFICANT CHANGE UP (ref 96–108)
CO2 SERPL-SCNC: 27 MMOL/L — SIGNIFICANT CHANGE UP (ref 22–29)
CREAT SERPL-MCNC: 0.9 MG/DL — SIGNIFICANT CHANGE UP (ref 0.5–1.3)
EGFR: 89 ML/MIN/1.73M2 — SIGNIFICANT CHANGE UP
EOSINOPHIL # BLD AUTO: 0.37 K/UL — SIGNIFICANT CHANGE UP (ref 0–0.5)
EOSINOPHIL NFR BLD AUTO: 5.7 % — SIGNIFICANT CHANGE UP (ref 0–6)
ESTIMATED AVERAGE GLUCOSE: 103 MG/DL — SIGNIFICANT CHANGE UP (ref 68–114)
GLUCOSE SERPL-MCNC: 72 MG/DL — SIGNIFICANT CHANGE UP (ref 70–99)
HCG SERPL-ACNC: <4 MIU/ML — SIGNIFICANT CHANGE UP
HCT VFR BLD CALC: 40 % — SIGNIFICANT CHANGE UP (ref 34.5–45)
HGB BLD-MCNC: 13 G/DL — SIGNIFICANT CHANGE UP (ref 11.5–15.5)
IMM GRANULOCYTES NFR BLD AUTO: 0.2 % — SIGNIFICANT CHANGE UP (ref 0–0.9)
INR BLD: 1.01 RATIO — SIGNIFICANT CHANGE UP (ref 0.88–1.16)
LYMPHOCYTES # BLD AUTO: 2.38 K/UL — SIGNIFICANT CHANGE UP (ref 1–3.3)
LYMPHOCYTES # BLD AUTO: 36.4 % — SIGNIFICANT CHANGE UP (ref 13–44)
MCHC RBC-ENTMCNC: 30.1 PG — SIGNIFICANT CHANGE UP (ref 27–34)
MCHC RBC-ENTMCNC: 32.5 GM/DL — SIGNIFICANT CHANGE UP (ref 32–36)
MCV RBC AUTO: 92.6 FL — SIGNIFICANT CHANGE UP (ref 80–100)
MONOCYTES # BLD AUTO: 0.78 K/UL — SIGNIFICANT CHANGE UP (ref 0–0.9)
MONOCYTES NFR BLD AUTO: 11.9 % — SIGNIFICANT CHANGE UP (ref 2–14)
NEUTROPHILS # BLD AUTO: 2.96 K/UL — SIGNIFICANT CHANGE UP (ref 1.8–7.4)
NEUTROPHILS NFR BLD AUTO: 45.2 % — SIGNIFICANT CHANGE UP (ref 43–77)
PLATELET # BLD AUTO: 318 K/UL — SIGNIFICANT CHANGE UP (ref 150–400)
POTASSIUM SERPL-MCNC: 4.7 MMOL/L — SIGNIFICANT CHANGE UP (ref 3.5–5.3)
POTASSIUM SERPL-SCNC: 4.7 MMOL/L — SIGNIFICANT CHANGE UP (ref 3.5–5.3)
PROTHROM AB SERPL-ACNC: 11.7 SEC — SIGNIFICANT CHANGE UP (ref 10.5–13.4)
RBC # BLD: 4.32 M/UL — SIGNIFICANT CHANGE UP (ref 3.8–5.2)
RBC # FLD: 13.4 % — SIGNIFICANT CHANGE UP (ref 10.3–14.5)
SODIUM SERPL-SCNC: 142 MMOL/L — SIGNIFICANT CHANGE UP (ref 135–145)
WBC # BLD: 6.54 K/UL — SIGNIFICANT CHANGE UP (ref 3.8–10.5)
WBC # FLD AUTO: 6.54 K/UL — SIGNIFICANT CHANGE UP (ref 3.8–10.5)

## 2023-03-27 PROCEDURE — 36415 COLL VENOUS BLD VENIPUNCTURE: CPT

## 2023-03-27 PROCEDURE — 85730 THROMBOPLASTIN TIME PARTIAL: CPT

## 2023-03-27 PROCEDURE — 80048 BASIC METABOLIC PNL TOTAL CA: CPT

## 2023-03-27 PROCEDURE — 85610 PROTHROMBIN TIME: CPT

## 2023-03-27 PROCEDURE — 83036 HEMOGLOBIN GLYCOSYLATED A1C: CPT

## 2023-03-27 PROCEDURE — 84702 CHORIONIC GONADOTROPIN TEST: CPT

## 2023-03-27 PROCEDURE — 85025 COMPLETE CBC W/AUTO DIFF WBC: CPT

## 2023-03-27 PROCEDURE — G0463: CPT

## 2023-04-07 ENCOUNTER — RESULT REVIEW (OUTPATIENT)
Age: 28
End: 2023-04-07

## 2023-04-07 ENCOUNTER — APPOINTMENT (OUTPATIENT)
Dept: COLORECTAL SURGERY | Facility: AMBULATORY SURGERY CENTER | Age: 28
End: 2023-04-07
Payer: COMMERCIAL

## 2023-04-07 DIAGNOSIS — K64.4 RESIDUAL HEMORRHOIDAL SKIN TAGS: ICD-10-CM

## 2023-04-07 DIAGNOSIS — K64.8 RESIDUAL HEMORRHOIDAL SKIN TAGS: ICD-10-CM

## 2023-04-07 PROCEDURE — 46260 REMOVE IN/EX HEM GROUPS 2+: CPT

## 2023-05-10 ENCOUNTER — APPOINTMENT (OUTPATIENT)
Dept: COLORECTAL SURGERY | Facility: CLINIC | Age: 28
End: 2023-05-10
Payer: COMMERCIAL

## 2023-05-10 VITALS
TEMPERATURE: 97.8 F | WEIGHT: 175 LBS | OXYGEN SATURATION: 99 % | RESPIRATION RATE: 14 BRPM | HEIGHT: 61 IN | SYSTOLIC BLOOD PRESSURE: 108 MMHG | DIASTOLIC BLOOD PRESSURE: 64 MMHG | BODY MASS INDEX: 33.04 KG/M2

## 2023-05-10 DIAGNOSIS — Z09 ENCOUNTER FOR FOLLOW-UP EXAMINATION AFTER COMPLETED TREATMENT FOR CONDITIONS OTHER THAN MALIGNANT NEOPLASM: ICD-10-CM

## 2023-05-10 PROCEDURE — 99024 POSTOP FOLLOW-UP VISIT: CPT

## 2023-05-10 RX ORDER — OXYCODONE AND ACETAMINOPHEN 5; 325 MG/1; MG/1
5-325 TABLET ORAL
Qty: 20 | Refills: 0 | Status: COMPLETED | COMMUNITY
Start: 2023-04-07 | End: 2023-05-10

## 2023-05-10 RX ORDER — KETOROLAC TROMETHAMINE 10 MG/1
10 TABLET, FILM COATED ORAL
Qty: 20 | Refills: 0 | Status: COMPLETED | COMMUNITY
Start: 2023-04-07 | End: 2023-05-10

## 2023-05-10 NOTE — HISTORY OF PRESENT ILLNESS
[FreeTextEntry1] : Liza returns to office for followup of her external hemorrhoids. She reports healing well from her bedside excision of residual hemorrhoidal skin tags. Unfortunately, in the month of December, she had a very busy schedule at work and perfomed significant heavy lifting with resultant swollen external hemorrhoids that have only recently begun to subside. She continues to deny any significant straining to evacuate stools, and notes her stools are soft and passed on a regular basis without bleeding or other issues.\par \par 1/22/20 Since her last office visit 1 week earlier, she reports not healing as well from this bedside excision as from prior. She continues passing soft stools, but reports sharp stabbing pains afterwards and associated throbbing. 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  Since her last appointment, she has delivered her first child, a baby girl–Armando, in January 2023.  During her pregnancy and delivery, she developed very large external hemorrhoids which have become less edematous and are now circumferential residual skin tags.  She has a significant amount of difficulties maintaining anorectal hygiene, and as such, it is impairing her quality of life.  Here to discuss excision.  No reports of constipation, straining or rectal bleeding.\par \par 5/10/23 Liza returns to the office for POV after undergoing on 4/14/23 Three quadrant hemorrhoidectomy. She had the anticipated postop pain for at least 2 weeks prior to feeling improved by week 3. BMs are passed daily. Some discharge noted, no bleeding.

## 2023-05-10 NOTE — PHYSICAL EXAM
[Normal rectal exam] : exam was normal [Reduce Spontaneously] : a spontaneously reducible (grade II) [Tender, Swollen] : nontender, non-swollen [Thrombosed] : that was not thrombosed [Skin Tags] : residual hemorrhoidal skin tags were noted [Normal] : was normal [None] : there was no rectal mass  [Gross Blood] : no gross blood [No Rash or Lesion] : No rash or lesion [Alert] : alert [Oriented to Person] : oriented to person [Oriented to Place] : oriented to place [Oriented to Time] : oriented to time [Calm] : calm [de-identified] : small anterior tag remains [de-identified] : Anticipated postop induration noted on exam [de-identified] : No apparent distress [de-identified] : Normocephalic atraumatic [de-identified] : Moving all extremities x4

## 2023-05-10 NOTE — ASSESSMENT
[FreeTextEntry1] : Ms. Marquez presents to the office for excision of external hemorrhoidal skin tag with associated thrombosis. This was performed at the office bedside. The operative site was prepped with Betadine, marked and anesthetized with 1% lidocaine to good effect. Sharp scissors was used to perform the excision and Monsell's was then utilized for hemostasis.\par Patient was advised to expect post procedure pain for at least one week's time. During this period, she is to avoid significant amount of straining or activity that could result in excess tissue swelling and lead to a recurrence of the external hemorrhoid/skin tag. She was also advised to expect post procedure drainage of the wound as it heals in by secondary intention. Cotton gauze should be placed within the undergarments to prevent soilage, and sitz bath should be performed 2-3 times daily to keep the site wound site clean and facilitate healing.\par \par 1/22/20 Returns to office for followup given anorectal pain. PE reveals that the wound base is clean but because of site of excision close to anal verge, may be causing anal fissure like pain with defecation. Advised pt of the findings and recommendations for bowel regimen, warm sitz baths, ibuprofen and analpram cream.\par Otherwise reassured that she appears to be healing in as expected.\par \par 3/23/23  Liza presents to the office with circumferential external hemorrhoids that she would like to have removed secondary to difficulty with hygiene as well as swelling and discomfort. I have advised her on the need to maintain a regular bowel regimen in the preop and postoperative period to prevent recurrent disease.  I have discussed with the risks/benefits/alternatives for external hemorrhoidectomy. Although the surgery is performed as a same-day procedure, and does not take longer than an hour to complete, the postoperative pain is the most difficult obstacle for patients to overcome. She is to expect rectal pain, pressure and discomfort for anywhere from 2-4 weeks duration during which time activity will likely be kept to a minimum. She will be provided with narcotic medication in addition to anti-inflammatories to alleviate the discomfort, and she should perform sitz baths frequently for comfort. Postoperatively, she will have open perianal wounds that will heal by secondary intention, so she is to expect serosanguineous drainage that will gradually improve.  She is aware of the likelihood for recurrent disease if she becomes pregnant again in the future.  After all questions and concerns were addressed, she consented to proceed.\par \par 5/10/23 Here for POV after undergoing 3 quadrant hemorrhoidectomy on 4/14/23. Feeling much improved. VIsual inspection reveals ongoing healing and mild swelling. KAMALA with mild induration on palpation. Overall, doing well. Reassured. F/u prn.\par \par

## 2023-06-21 ENCOUNTER — APPOINTMENT (OUTPATIENT)
Dept: NEUROLOGY | Facility: CLINIC | Age: 28
End: 2023-06-21
Payer: COMMERCIAL

## 2023-06-21 VITALS
HEIGHT: 61 IN | DIASTOLIC BLOOD PRESSURE: 64 MMHG | BODY MASS INDEX: 33.04 KG/M2 | WEIGHT: 175 LBS | SYSTOLIC BLOOD PRESSURE: 108 MMHG

## 2023-06-21 DIAGNOSIS — G43.909 MIGRAINE, UNSPECIFIED, NOT INTRACTABLE, W/OUT STATUS MIGRAINOSUS: ICD-10-CM

## 2023-06-21 PROCEDURE — 99214 OFFICE O/P EST MOD 30 MIN: CPT

## 2023-06-21 RX ORDER — PROPRANOLOL HYDROCHLORIDE 40 MG/1
40 TABLET ORAL
Qty: 180 | Refills: 3 | Status: ACTIVE | COMMUNITY
Start: 2022-04-11 | End: 1900-01-01

## 2023-06-21 NOTE — ASSESSMENT
[FreeTextEntry1] : Intractable migraine headaches\par Her examination is normal \par She has had an excellent response to propranolol 40 mg twice a day for headache prophylaxis\par She will be continued\par I filled out the appropriate motor vehicle forms to allow her to have tinted windows on her car\par \par Follow-up here in 1 year and by phone prior to that as needed.

## 2023-06-21 NOTE — HISTORY OF PRESENT ILLNESS
[FreeTextEntry1] : I saw her initially 4/11/2022 with the following history.  She reports migraine type headaches going on for at least the last 10 years.  She has seen a neurologist in the past with normal brain MRI.  They have become more frequent.  Now getting them almost every other day.  She gets severe pain behind the eyes.  Is throbbing.  Occasionally blurred vision.  Frequent nausea with occasional vomiting.  Typically there is photophobia.  Sumatriptan  100 mg generally works but she feels high on it and does not like taking it.\par \par Medical history otherwise unremarkable\par \par She is a .\par \par I started her on propranolol 40 mg twice a day for headache prophylaxis.  She has had an excellent response with no significant headaches since starting the propranolol.\par \par She is a propranolol through most of her recent pregnancy without a problem\par \par She is now 5 months postpartum.  She gets an infrequent headache which responds to Tylenol.  She does have extreme photophobia which can trigger a migraine and says she requires tinted windows for her car.

## 2023-06-21 NOTE — CONSULT LETTER
[Dear  ___] : Dear  [unfilled], [Consult Letter:] : I had the pleasure of evaluating your patient, [unfilled]. [Please see my note below.] : Please see my note below. [Consult Closing:] : Thank you very much for allowing me to participate in the care of this patient.  If you have any questions, please do not hesitate to contact me. [Sincerely,] : Sincerely, [FreeTextEntry3] : Jerald Colunga MD, PhD, DPN-N\par [facility]\par Neurology at [place]\par Chief, Division of Neurology\par [facility]\par

## 2023-12-17 ENCOUNTER — NON-APPOINTMENT (OUTPATIENT)
Age: 28
End: 2023-12-17

## 2024-03-27 ENCOUNTER — NON-APPOINTMENT (OUTPATIENT)
Age: 29
End: 2024-03-27

## 2024-06-11 ENCOUNTER — APPOINTMENT (OUTPATIENT)
Dept: NEUROLOGY | Facility: CLINIC | Age: 29
End: 2024-06-11

## 2024-08-23 ENCOUNTER — NON-APPOINTMENT (OUTPATIENT)
Age: 29
End: 2024-08-23

## 2025-01-13 ENCOUNTER — NON-APPOINTMENT (OUTPATIENT)
Age: 30
End: 2025-01-13